# Patient Record
Sex: FEMALE | Race: OTHER | HISPANIC OR LATINO | URBAN - METROPOLITAN AREA
[De-identification: names, ages, dates, MRNs, and addresses within clinical notes are randomized per-mention and may not be internally consistent; named-entity substitution may affect disease eponyms.]

---

## 2022-01-25 ENCOUNTER — OUTPATIENT (OUTPATIENT)
Dept: INPATIENT UNIT | Facility: HOSPITAL | Age: 27
LOS: 1 days | End: 2022-01-25
Payer: MEDICAID

## 2022-01-25 VITALS — DIASTOLIC BLOOD PRESSURE: 63 MMHG | HEART RATE: 53 BPM | SYSTOLIC BLOOD PRESSURE: 103 MMHG

## 2022-01-25 VITALS — SYSTOLIC BLOOD PRESSURE: 100 MMHG | HEART RATE: 56 BPM | DIASTOLIC BLOOD PRESSURE: 61 MMHG

## 2022-01-25 DIAGNOSIS — O47.1 FALSE LABOR AT OR AFTER 37 COMPLETED WEEKS OF GESTATION: ICD-10-CM

## 2022-01-25 LAB
APPEARANCE UR: ABNORMAL
BACTERIA # UR AUTO: ABNORMAL
BASOPHILS # BLD AUTO: 0.02 K/UL — SIGNIFICANT CHANGE UP (ref 0–0.2)
BASOPHILS NFR BLD AUTO: 0.3 % — SIGNIFICANT CHANGE UP (ref 0–2)
BILIRUB UR-MCNC: NEGATIVE — SIGNIFICANT CHANGE UP
BLD GP AB SCN SERPL QL: SIGNIFICANT CHANGE UP
COLOR SPEC: YELLOW — SIGNIFICANT CHANGE UP
DIFF PNL FLD: NEGATIVE — SIGNIFICANT CHANGE UP
EOSINOPHIL # BLD AUTO: 0.19 K/UL — SIGNIFICANT CHANGE UP (ref 0–0.5)
EOSINOPHIL NFR BLD AUTO: 2.5 % — SIGNIFICANT CHANGE UP (ref 0–6)
EPI CELLS # UR: ABNORMAL
GLUCOSE UR QL: NEGATIVE MG/DL — SIGNIFICANT CHANGE UP
HCT VFR BLD CALC: 36.2 % — SIGNIFICANT CHANGE UP (ref 34.5–45)
HGB BLD-MCNC: 12 G/DL — SIGNIFICANT CHANGE UP (ref 11.5–15.5)
HIV 1 & 2 AB SERPL IA.RAPID: SIGNIFICANT CHANGE UP
IMM GRANULOCYTES NFR BLD AUTO: 0.4 % — SIGNIFICANT CHANGE UP (ref 0–1.5)
KETONES UR-MCNC: NEGATIVE — SIGNIFICANT CHANGE UP
LEUKOCYTE ESTERASE UR-ACNC: ABNORMAL
LYMPHOCYTES # BLD AUTO: 2.2 K/UL — SIGNIFICANT CHANGE UP (ref 1–3.3)
LYMPHOCYTES # BLD AUTO: 29.1 % — SIGNIFICANT CHANGE UP (ref 13–44)
MCHC RBC-ENTMCNC: 28.7 PG — SIGNIFICANT CHANGE UP (ref 27–34)
MCHC RBC-ENTMCNC: 33.1 GM/DL — SIGNIFICANT CHANGE UP (ref 32–36)
MCV RBC AUTO: 86.6 FL — SIGNIFICANT CHANGE UP (ref 80–100)
MONOCYTES # BLD AUTO: 0.44 K/UL — SIGNIFICANT CHANGE UP (ref 0–0.9)
MONOCYTES NFR BLD AUTO: 5.8 % — SIGNIFICANT CHANGE UP (ref 2–14)
NEUTROPHILS # BLD AUTO: 4.67 K/UL — SIGNIFICANT CHANGE UP (ref 1.8–7.4)
NEUTROPHILS NFR BLD AUTO: 61.9 % — SIGNIFICANT CHANGE UP (ref 43–77)
NITRITE UR-MCNC: NEGATIVE — SIGNIFICANT CHANGE UP
PH UR: 6 — SIGNIFICANT CHANGE UP (ref 5–8)
PLATELET # BLD AUTO: 211 K/UL — SIGNIFICANT CHANGE UP (ref 150–400)
PROT UR-MCNC: NEGATIVE — SIGNIFICANT CHANGE UP
RBC # BLD: 4.18 M/UL — SIGNIFICANT CHANGE UP (ref 3.8–5.2)
RBC # FLD: 15 % — HIGH (ref 10.3–14.5)
RBC CASTS # UR COMP ASSIST: NEGATIVE /HPF — SIGNIFICANT CHANGE UP (ref 0–4)
SP GR SPEC: 1.01 — SIGNIFICANT CHANGE UP (ref 1.01–1.02)
UROBILINOGEN FLD QL: NEGATIVE MG/DL — SIGNIFICANT CHANGE UP
WBC # BLD: 7.55 K/UL — SIGNIFICANT CHANGE UP (ref 3.8–10.5)
WBC # FLD AUTO: 7.55 K/UL — SIGNIFICANT CHANGE UP (ref 3.8–10.5)
WBC UR QL: ABNORMAL /HPF (ref 0–5)

## 2022-01-25 PROCEDURE — 87340 HEPATITIS B SURFACE AG IA: CPT

## 2022-01-25 PROCEDURE — 81001 URINALYSIS AUTO W/SCOPE: CPT

## 2022-01-25 PROCEDURE — 86850 RBC ANTIBODY SCREEN: CPT

## 2022-01-25 PROCEDURE — 86900 BLOOD TYPING SEROLOGIC ABO: CPT

## 2022-01-25 PROCEDURE — 86762 RUBELLA ANTIBODY: CPT

## 2022-01-25 PROCEDURE — 86703 HIV-1/HIV-2 1 RESULT ANTBDY: CPT

## 2022-01-25 PROCEDURE — 86780 TREPONEMA PALLIDUM: CPT

## 2022-01-25 PROCEDURE — 85025 COMPLETE CBC W/AUTO DIFF WBC: CPT

## 2022-01-25 PROCEDURE — 83020 HEMOGLOBIN ELECTROPHORESIS: CPT | Mod: 26

## 2022-01-25 PROCEDURE — 36415 COLL VENOUS BLD VENIPUNCTURE: CPT

## 2022-01-25 PROCEDURE — 86765 RUBEOLA ANTIBODY: CPT

## 2022-01-25 PROCEDURE — 86901 BLOOD TYPING SEROLOGIC RH(D): CPT

## 2022-01-25 PROCEDURE — G0463: CPT

## 2022-01-25 PROCEDURE — 87389 HIV-1 AG W/HIV-1&-2 AB AG IA: CPT

## 2022-01-25 PROCEDURE — 59025 FETAL NON-STRESS TEST: CPT

## 2022-01-25 PROCEDURE — 83020 HEMOGLOBIN ELECTROPHORESIS: CPT

## 2022-01-25 NOTE — OB PROVIDER TRIAGE NOTE - ATTENDING COMMENTS
No Ob complaints   fetal tracing reactive   no contractions  blood work drawn  pt scheduled for sono 2/2/22  she will follow up in clinic next week

## 2022-01-25 NOTE — OB PROVIDER TRIAGE NOTE - NSHPPHYSICALEXAM_GEN_ALL_CORE
Vital Signs Last 24 Hrs  T(C): 36.8 (25 Jan 2022 12:59), Max: 36.8 (25 Jan 2022 12:59)  T(F): 98.2 (25 Jan 2022 12:59), Max: 98.2 (25 Jan 2022 12:59)  HR: 56 (25 Jan 2022 13:16) (53 - 56)  BP: 100/61 (25 Jan 2022 13:16) (100/61 - 103/63)  BP(mean): --  RR: 16 (25 Jan 2022 12:59) (16 - 16)  SpO2: --    Gen: NAD, AAOx3  CV: rrr, s1/s2 present  Lung: CTABL  Abd: soft, gravid  SVE: deferred  Ext: no pain/swelling    FH: moderate variability, baseline 130s, accels, no decels  [place]: no contractions Hemostasis: Electrocautery

## 2022-01-25 NOTE — OB PROVIDER TRIAGE NOTE - HISTORY OF PRESENT ILLNESS
27yo  @ 35.6w who comes into L&D from the clinic. Patient denies any vaginal bleeding, loss of fluid, contractions. Reports +FM.    Pregnancy complicated by:  limited PNC    PMH: denies  PSH: denies  POB: denies  PGYN: denies  All: NKDA  Med: none

## 2022-01-25 NOTE — OB PROVIDER TRIAGE NOTE - NSOBPROVIDERNOTE_OBGYN_ALL_OB_FT
25 yo  @ 35.6w sent from the office  - PNL drawn  - US appt for 2/2 2pm  - NST reactive  d/w Dr. Interiano - can be discharged

## 2022-01-25 NOTE — OB RN TRIAGE NOTE - FALL HARM RISK - UNIVERSAL INTERVENTIONS
Bed in lowest position, wheels locked, appropriate side rails in place/Call bell, personal items and telephone in reach/Instruct patient to call for assistance before getting out of bed or chair/Non-slip footwear when patient is out of bed/[place] to call system/Physically safe environment - no spills, clutter or unnecessary equipment/Purposeful Proactive Rounding/Room/bathroom lighting operational, light cord in reach

## 2022-01-25 NOTE — OB PROVIDER TRIAGE NOTE - CURRENT PREGNANCY COMPLICATIONS, OB PROFILE
Detail Level: Zone Detail Level: Generalized Detail Level: Detailed Gestational Age less than 36 Weeks

## 2022-01-26 LAB
HBV SURFACE AG SERPL QL IA: SIGNIFICANT CHANGE UP
HEMOGLOBIN INTERPRETATION: SIGNIFICANT CHANGE UP
HGB A MFR BLD: 97.5 % — SIGNIFICANT CHANGE UP (ref 95.8–98)
HGB A2 MFR BLD: 2.5 % — SIGNIFICANT CHANGE UP (ref 2–3.2)
HIV 1+2 AB+HIV1 P24 AG SERPL QL IA: SIGNIFICANT CHANGE UP
MEV IGG SER-ACNC: 42.3 AU/ML — SIGNIFICANT CHANGE UP
MEV IGG+IGM SER-IMP: POSITIVE — SIGNIFICANT CHANGE UP
RUBV IGG SER-ACNC: 13.7 INDEX — SIGNIFICANT CHANGE UP
RUBV IGG SER-IMP: POSITIVE — SIGNIFICANT CHANGE UP
T PALLIDUM AB TITR SER: NEGATIVE — SIGNIFICANT CHANGE UP

## 2022-02-01 PROBLEM — Z78.9 OTHER SPECIFIED HEALTH STATUS: Chronic | Status: ACTIVE | Noted: 2022-01-25

## 2022-02-02 ENCOUNTER — APPOINTMENT (OUTPATIENT)
Dept: ANTEPARTUM | Facility: CLINIC | Age: 27
End: 2022-02-02
Payer: MEDICAID

## 2022-02-02 ENCOUNTER — ASOB RESULT (OUTPATIENT)
Age: 27
End: 2022-02-02

## 2022-02-02 PROBLEM — Z00.00 ENCOUNTER FOR PREVENTIVE HEALTH EXAMINATION: Status: ACTIVE | Noted: 2022-02-02

## 2022-02-02 PROCEDURE — 76816 OB US FOLLOW-UP PER FETUS: CPT

## 2022-02-17 ENCOUNTER — RESULT REVIEW (OUTPATIENT)
Age: 27
End: 2022-02-17

## 2022-02-17 ENCOUNTER — INPATIENT (INPATIENT)
Facility: HOSPITAL | Age: 27
LOS: 2 days | Discharge: HOME | End: 2022-02-20
Attending: OBSTETRICS & GYNECOLOGY | Admitting: OBSTETRICS & GYNECOLOGY
Payer: COMMERCIAL

## 2022-02-17 VITALS
RESPIRATION RATE: 18 BRPM | TEMPERATURE: 98 F | HEART RATE: 66 BPM | SYSTOLIC BLOOD PRESSURE: 119 MMHG | DIASTOLIC BLOOD PRESSURE: 76 MMHG

## 2022-02-17 DIAGNOSIS — O47.1 FALSE LABOR AT OR AFTER 37 COMPLETED WEEKS OF GESTATION: ICD-10-CM

## 2022-02-17 DIAGNOSIS — O26.893 OTHER SPECIFIED PREGNANCY RELATED CONDITIONS, THIRD TRIMESTER: ICD-10-CM

## 2022-02-17 LAB
BASOPHILS # BLD AUTO: 0.03 K/UL — SIGNIFICANT CHANGE UP (ref 0–0.2)
BASOPHILS NFR BLD AUTO: 0.3 % — SIGNIFICANT CHANGE UP (ref 0–2)
BLD GP AB SCN SERPL QL: SIGNIFICANT CHANGE UP
EOSINOPHIL # BLD AUTO: 0.16 K/UL — SIGNIFICANT CHANGE UP (ref 0–0.5)
EOSINOPHIL NFR BLD AUTO: 1.8 % — SIGNIFICANT CHANGE UP (ref 0–6)
HCT VFR BLD CALC: 34.3 % — LOW (ref 34.5–45)
HGB BLD-MCNC: 11.4 G/DL — LOW (ref 11.5–15.5)
HIV 1 & 2 AB SERPL IA.RAPID: SIGNIFICANT CHANGE UP
IMM GRANULOCYTES NFR BLD AUTO: 0.3 % — SIGNIFICANT CHANGE UP (ref 0–1.5)
LYMPHOCYTES # BLD AUTO: 2.44 K/UL — SIGNIFICANT CHANGE UP (ref 1–3.3)
LYMPHOCYTES # BLD AUTO: 27.1 % — SIGNIFICANT CHANGE UP (ref 13–44)
MCHC RBC-ENTMCNC: 29.2 PG — SIGNIFICANT CHANGE UP (ref 27–34)
MCHC RBC-ENTMCNC: 33.2 GM/DL — SIGNIFICANT CHANGE UP (ref 32–36)
MCV RBC AUTO: 87.7 FL — SIGNIFICANT CHANGE UP (ref 80–100)
MONOCYTES # BLD AUTO: 0.58 K/UL — SIGNIFICANT CHANGE UP (ref 0–0.9)
MONOCYTES NFR BLD AUTO: 6.5 % — SIGNIFICANT CHANGE UP (ref 2–14)
NEUTROPHILS # BLD AUTO: 5.75 K/UL — SIGNIFICANT CHANGE UP (ref 1.8–7.4)
NEUTROPHILS NFR BLD AUTO: 64 % — SIGNIFICANT CHANGE UP (ref 43–77)
PLATELET # BLD AUTO: 204 K/UL — SIGNIFICANT CHANGE UP (ref 150–400)
RBC # BLD: 3.91 M/UL — SIGNIFICANT CHANGE UP (ref 3.8–5.2)
RBC # FLD: 17.5 % — HIGH (ref 10.3–14.5)
SARS-COV-2 RNA SPEC QL NAA+PROBE: SIGNIFICANT CHANGE UP
WBC # BLD: 8.99 K/UL — SIGNIFICANT CHANGE UP (ref 3.8–10.5)
WBC # FLD AUTO: 8.99 K/UL — SIGNIFICANT CHANGE UP (ref 3.8–10.5)

## 2022-02-17 PROCEDURE — 88307 TISSUE EXAM BY PATHOLOGIST: CPT | Mod: 26

## 2022-02-17 RX ORDER — AMPICILLIN TRIHYDRATE 250 MG
1 CAPSULE ORAL EVERY 4 HOURS
Refills: 0 | Status: DISCONTINUED | OUTPATIENT
Start: 2022-02-17 | End: 2022-02-18

## 2022-02-17 RX ORDER — OXYTOCIN 10 UNIT/ML
333.33 VIAL (ML) INJECTION
Qty: 20 | Refills: 0 | Status: DISCONTINUED | OUTPATIENT
Start: 2022-02-17 | End: 2022-02-20

## 2022-02-17 RX ORDER — CITRIC ACID/SODIUM CITRATE 300-500 MG
30 SOLUTION, ORAL ORAL ONCE
Refills: 0 | Status: DISCONTINUED | OUTPATIENT
Start: 2022-02-17 | End: 2022-02-18

## 2022-02-17 RX ORDER — SODIUM CHLORIDE 9 MG/ML
1000 INJECTION, SOLUTION INTRAVENOUS
Refills: 0 | Status: DISCONTINUED | OUTPATIENT
Start: 2022-02-17 | End: 2022-02-18

## 2022-02-17 RX ORDER — OXYTOCIN 10 UNIT/ML
2 VIAL (ML) INJECTION
Qty: 30 | Refills: 0 | Status: DISCONTINUED | OUTPATIENT
Start: 2022-02-17 | End: 2022-02-20

## 2022-02-17 RX ORDER — AMPICILLIN TRIHYDRATE 250 MG
2 CAPSULE ORAL ONCE
Refills: 0 | Status: COMPLETED | OUTPATIENT
Start: 2022-02-17 | End: 2022-02-17

## 2022-02-17 RX ADMIN — Medication 2 MILLIUNIT(S)/MIN: at 20:30

## 2022-02-17 RX ADMIN — Medication 108 GRAM(S): at 22:31

## 2022-02-17 NOTE — OB PROVIDER H&P - NSHPPHYSICALEXAM_GEN_ALL_CORE
HR: 66 (17 Feb 2022 17:13) (66 - 66)  BP: 119/76 (17 Feb 2022 17:13) (119/76 - 119/76)     Gen: AAOx3  Abd: soft, gravid  Ext: no tenderness to calf palpation    SVE: 5/50/-2    FHT: baseline 130, moderate variability, + acels, -decels  [place]: CTX q4-5min HR: 66 (17 Feb 2022 17:13) (66 - 66)  BP: 119/76 (17 Feb 2022 17:13) (119/76 - 119/76)     Gen: AAOx3  Abd: soft, gravid  Ext: no tenderness to calf palpation    SVE: 5/50/-2  Bedside sono: VTX, anterior placenta    FHT: baseline 130, moderate variability, + acels, -decels  [place]: CTX q4-5min

## 2022-02-17 NOTE — OB PROVIDER H&P - ATTENDING COMMENTS
G1 at 22lxf7d admitted for labor. GBSpos. Maternal/fetal status reassuring    -admit  -expectant mgmt  -routine admission orders  -reassess in 2hrs or prn  -amp for GBS proph  -consents explained and signed  all questions and concerns answered  ppalos

## 2022-02-17 NOTE — OB PROVIDER H&P - HISTORY OF PRESENT ILLNESS
27yo  at 39w1d who presents from clinic with cervical exam of /-3. Patient endorses CTX q5min. Endorses good FM. Denies LOF and VB. Denies HA, changes in vision, CP, SOB, nausea and vomiting.     JAH: 22  LMP: 21    Pregnancy course significant for:  1. Positive quant, needs CXR PP    ObHx: denies  GynHx: denies hx of ovarian cysts, fibroids, STIs and abnormal paps  PMH: denies  Meds: PNV  All: NKDA  SurgHx: denies  SocialHx: denies EtOH, tobacco and illicit drug use in pregnancy

## 2022-02-17 NOTE — OB PROVIDER H&P - ASSESSMENT
A/P: 27yo  at 39w1d who is admitted to L&D in labor  -Admit to L&D  -Consent  -Admission labs  -IV fluids  -Fetus: Cat I tracing. Continuous toco and fetal monitoring.   -GBS: positive, amp ordered    Discussed with Dr. Dumont

## 2022-02-17 NOTE — OB RN PATIENT PROFILE - FALL HARM RISK - UNIVERSAL INTERVENTIONS
Bed in lowest position, wheels locked, appropriate side rails in place/Call bell, personal items and telephone in reach/Instruct patient to call for assistance before getting out of bed or chair/Non-slip footwear when patient is out of bed/[place] to call system/Physically safe environment - no spills, clutter or unnecessary equipment/Purposeful Proactive Rounding/Room/bathroom lighting operational, light cord in reach

## 2022-02-18 ENCOUNTER — TRANSCRIPTION ENCOUNTER (OUTPATIENT)
Age: 27
End: 2022-02-18

## 2022-02-18 LAB
COVID-19 SPIKE DOMAIN AB INTERP: POSITIVE
COVID-19 SPIKE DOMAIN ANTIBODY RESULT: 34.2 U/ML — HIGH
HIV 1+2 AB+HIV1 P24 AG SERPL QL IA: SIGNIFICANT CHANGE UP
MEV IGG SER-ACNC: 44.6 AU/ML — SIGNIFICANT CHANGE UP
MEV IGG+IGM SER-IMP: POSITIVE — SIGNIFICANT CHANGE UP
SARS-COV-2 IGG+IGM SERPL QL IA: 34.2 U/ML — HIGH
SARS-COV-2 IGG+IGM SERPL QL IA: POSITIVE
T PALLIDUM AB TITR SER: NEGATIVE — SIGNIFICANT CHANGE UP

## 2022-02-18 RX ORDER — ACETAMINOPHEN 500 MG
975 TABLET ORAL
Refills: 0 | Status: DISCONTINUED | OUTPATIENT
Start: 2022-02-18 | End: 2022-02-20

## 2022-02-18 RX ORDER — LANOLIN
1 OINTMENT (GRAM) TOPICAL EVERY 6 HOURS
Refills: 0 | Status: DISCONTINUED | OUTPATIENT
Start: 2022-02-18 | End: 2022-02-20

## 2022-02-18 RX ORDER — KETOROLAC TROMETHAMINE 30 MG/ML
30 SYRINGE (ML) INJECTION ONCE
Refills: 0 | Status: DISCONTINUED | OUTPATIENT
Start: 2022-02-18 | End: 2022-02-18

## 2022-02-18 RX ORDER — DIPHENHYDRAMINE HCL 50 MG
25 CAPSULE ORAL EVERY 6 HOURS
Refills: 0 | Status: DISCONTINUED | OUTPATIENT
Start: 2022-02-18 | End: 2022-02-20

## 2022-02-18 RX ORDER — DIBUCAINE 1 %
1 OINTMENT (GRAM) RECTAL EVERY 6 HOURS
Refills: 0 | Status: DISCONTINUED | OUTPATIENT
Start: 2022-02-18 | End: 2022-02-20

## 2022-02-18 RX ORDER — BENZOCAINE 10 %
1 GEL (GRAM) MUCOUS MEMBRANE EVERY 6 HOURS
Refills: 0 | Status: DISCONTINUED | OUTPATIENT
Start: 2022-02-18 | End: 2022-02-20

## 2022-02-18 RX ORDER — SODIUM CHLORIDE 9 MG/ML
3 INJECTION INTRAMUSCULAR; INTRAVENOUS; SUBCUTANEOUS EVERY 8 HOURS
Refills: 0 | Status: DISCONTINUED | OUTPATIENT
Start: 2022-02-18 | End: 2022-02-20

## 2022-02-18 RX ORDER — MAGNESIUM HYDROXIDE 400 MG/1
30 TABLET, CHEWABLE ORAL
Refills: 0 | Status: DISCONTINUED | OUTPATIENT
Start: 2022-02-18 | End: 2022-02-20

## 2022-02-18 RX ORDER — TETANUS TOXOID, REDUCED DIPHTHERIA TOXOID AND ACELLULAR PERTUSSIS VACCINE, ADSORBED 5; 2.5; 8; 8; 2.5 [IU]/.5ML; [IU]/.5ML; UG/.5ML; UG/.5ML; UG/.5ML
0.5 SUSPENSION INTRAMUSCULAR ONCE
Refills: 0 | Status: DISCONTINUED | OUTPATIENT
Start: 2022-02-18 | End: 2022-02-20

## 2022-02-18 RX ORDER — AER TRAVELER 0.5 G/1
1 SOLUTION RECTAL; TOPICAL EVERY 4 HOURS
Refills: 0 | Status: DISCONTINUED | OUTPATIENT
Start: 2022-02-18 | End: 2022-02-20

## 2022-02-18 RX ORDER — IBUPROFEN 200 MG
600 TABLET ORAL EVERY 6 HOURS
Refills: 0 | Status: COMPLETED | OUTPATIENT
Start: 2022-02-18 | End: 2023-01-17

## 2022-02-18 RX ORDER — SIMETHICONE 80 MG/1
80 TABLET, CHEWABLE ORAL EVERY 4 HOURS
Refills: 0 | Status: DISCONTINUED | OUTPATIENT
Start: 2022-02-18 | End: 2022-02-20

## 2022-02-18 RX ORDER — HYDROCORTISONE 1 %
1 OINTMENT (GRAM) TOPICAL EVERY 6 HOURS
Refills: 0 | Status: DISCONTINUED | OUTPATIENT
Start: 2022-02-18 | End: 2022-02-20

## 2022-02-18 RX ORDER — OXYTOCIN 10 UNIT/ML
333.33 VIAL (ML) INJECTION
Qty: 20 | Refills: 0 | Status: DISCONTINUED | OUTPATIENT
Start: 2022-02-18 | End: 2022-02-20

## 2022-02-18 RX ORDER — SODIUM CHLORIDE 9 MG/ML
1000 INJECTION, SOLUTION INTRAVENOUS ONCE
Refills: 0 | Status: COMPLETED | OUTPATIENT
Start: 2022-02-18 | End: 2022-02-18

## 2022-02-18 RX ORDER — PRAMOXINE HYDROCHLORIDE 150 MG/15G
1 AEROSOL, FOAM RECTAL EVERY 4 HOURS
Refills: 0 | Status: DISCONTINUED | OUTPATIENT
Start: 2022-02-18 | End: 2022-02-20

## 2022-02-18 RX ADMIN — Medication 30 MILLIGRAM(S): at 09:17

## 2022-02-18 RX ADMIN — Medication 108 GRAM(S): at 06:00

## 2022-02-18 RX ADMIN — Medication 30 MILLIGRAM(S): at 09:39

## 2022-02-18 RX ADMIN — SODIUM CHLORIDE 3 MILLILITER(S): 9 INJECTION INTRAMUSCULAR; INTRAVENOUS; SUBCUTANEOUS at 22:37

## 2022-02-18 RX ADMIN — Medication 108 GRAM(S): at 02:13

## 2022-02-18 RX ADMIN — Medication 1 TABLET(S): at 16:12

## 2022-02-18 RX ADMIN — Medication 975 MILLIGRAM(S): at 16:11

## 2022-02-18 RX ADMIN — Medication 1000 MILLIUNIT(S)/MIN: at 07:33

## 2022-02-18 RX ADMIN — SODIUM CHLORIDE 1000 MILLILITER(S): 9 INJECTION, SOLUTION INTRAVENOUS at 03:59

## 2022-02-18 NOTE — DISCHARGE NOTE OB - NS MD DC FALL RISK RISK
For information on Fall & Injury Prevention, visit: [email or web address] OR  [email or web address] OR  https://www.cdc.gov/steadi/patient.html

## 2022-02-18 NOTE — DISCHARGE NOTE OB - MATERIALS PROVIDED
New Beginnings/Vaccinations/Breastfeeding Log/Breastfeeding Mother’s Support Group Information/Guide to Postpartum Care/[place] Hearing Screen Program/Back To Sleep Handout/Shaken Baby Prevention Handout/Breastfeeding Guide and Packet/Birth Certificate Instructions

## 2022-02-18 NOTE — OB PROVIDER LABOR PROGRESS NOTE - NS_OBIHIFHRDETAILS_OBGYN_ALL_OB_FT
FHT: baseline 135, mod variability, +accels, early decels
FHT: baseline 150, mod variability, -accels, early decels
FHT: baseline 135, mod variability, -accels, late decels
FHT: baseline 145, mod variability, -accels, variable decel

## 2022-02-18 NOTE — OB PROVIDER DELIVERY SUMMARY - NSPROVIDERDELIVERYNOTE_OBGYN_ALL_OB_FT
Admitted in labor with pitocin augmentation and epidural for labor progressed to deliver  live baby boy at 7:28 AM compound presentation right hand. Placenta spontaneous complete. No epis, no laceration. Blood loss 280 cc.

## 2022-02-18 NOTE — OB RN DELIVERY SUMMARY - NSSELHIDDEN_OBGYN_ALL_OB_FT
[NS_DeliveryAttending1_OBGYN_ALL_OB_FT:[id number]==],[NS_DeliveryRN_OBGYN_ALL_OB_FT:[id number]=],[NS_CirculateRN2_OBGYN_ALL_OB_FT:[id number]=]

## 2022-02-18 NOTE — DISCHARGE NOTE OB - MEDICATION SUMMARY - MEDICATIONS TO TAKE
I will START or STAY ON the medications listed below when I get home from the hospital:    ibuprofen 600 mg oral tablet  -- 1 tab(s) by mouth every 6 hours, As Needed -for mild pain   -- Do not take this drug if you are pregnant.  It is very important that you take or use this exactly as directed.  Do not skip doses or discontinue unless directed by your doctor.  May cause drowsiness or dizziness.  Obtain medical advice before taking any non-prescription drugs as some may affect the action of this medication.  Take with food or milk.    -- Indication: For pain    Tylenol 325 mg oral tablet  -- 2 tab(s) by mouth every 6 hours, As Needed -for moderate pain   -- This product contains acetaminophen.  Do not use  with any other product containing acetaminophen to prevent possible liver damage.    -- Indication: For pain

## 2022-02-18 NOTE — DISCHARGE NOTE OB - HOSPITAL COURSE
s/p uncomplicated  on . Patient transferred to post partum unit, uncomplicated hospital course. At the time of discharge patient was tolerating regular diet PO, ambulating, voiding, and demonstrating bowel function. Pain well controlled with pain medications PRN.

## 2022-02-18 NOTE — DISCHARGE NOTE OB - PATIENT PORTAL LINK FT
You can access the FollowMyHealth Patient Portal offered by [facility] by registering at the following website: [email or web address]. By joining [facility]’s FollowMyHealth portal, you will also be able to view your health information using other applications (apps) compatible with our system.

## 2022-02-18 NOTE — OB PROVIDER LABOR PROGRESS NOTE - NS_SUBJECTIVE/OBJECTIVE_OBGYN_ALL_OB_FT
pt feeling her ctx painfully    Vital Signs Last 24 Hrs  T(C): 36.8 (17 Feb 2022 19:21), Max: 37.1 (17 Feb 2022 19:00)  T(F): 98.24 (17 Feb 2022 19:21), Max: 98.78 (17 Feb 2022 19:00)  HR: 64 (17 Feb 2022 19:22) (60 - 66)  BP: 95/58 (17 Feb 2022 19:22) (91/58 - 119/76)  RR: 16 (17 Feb 2022 19:00) (16 - 18)
pt comfortable w epi in place    Vital Signs Last 24 Hrs  T(C): 36.8 (18 Feb 2022 03:24), Max: 37.1 (17 Feb 2022 19:00)  T(F): 98.24 (18 Feb 2022 03:24), Max: 98.78 (17 Feb 2022 19:00)  HR: 69 (18 Feb 2022 03:53) (52 - 86)  BP: 114/61 (18 Feb 2022 03:53) (91/58 - 131/80)  RR: 16 (17 Feb 2022 19:00) (16 - 18)  SpO2: 100% (18 Feb 2022 03:52) (98% - 100%)
pt comfortable w epi    Vital Signs Last 24 Hrs  T(C): 36.8 (18 Feb 2022 01:30), Max: 37.1 (17 Feb 2022 19:00)  T(F): 98.24 (18 Feb 2022 01:30), Max: 98.78 (17 Feb 2022 19:00)  HR: 80 (18 Feb 2022 02:07) (52 - 86)  BP: 105/52 (18 Feb 2022 01:59) (91/58 - 131/80)  RR: 16 (17 Feb 2022 19:00) (16 - 18)  SpO2: 100% (18 Feb 2022 02:02) (98% - 100%)
pt comfortable w epi in place  starting to feel some discomfort w vaginal pressure

## 2022-02-18 NOTE — OB PROVIDER LABOR PROGRESS NOTE - ASSESSMENT
VSS  cat 1 FHT  on pit. increase as tolerated per fht and ctx pattern  s/p AROM mec  continuous fetal and toco monitoring 
AROM mec at time of exam  variable decel s/p AROM, tracing  has been otherwise cat 1  continuous monitoring  epidural prn  start pit in 30m if tracing permits    discussed with Dr. Griffith
VSS  intermittently cat 2 FHT. reposition and resuscitate as necessary   on pit. modify as indicated per fht  continuous fetal and toco monitoring 
VSS  cat 1 FHT  on pit  continuous fetal and toco monitoring

## 2022-02-18 NOTE — OB NEONATOLOGY/PEDIATRICIAN DELIVERY SUMMARY - NSPEDSNEONOTESA_OBGYN_ALL_OB_FT
Called to 39.2 week vaginal delivery for meconium. Baby emerged depressed with poor tone and minimal respiratory effort. PPV 20/5 given x30 seconds with improvement in heart rate and spontaneous respiratory effort/color. CPAP given x2 minutes. Baby appeared comfortable with no retractions. Sats . Dispo to [facility].

## 2022-02-18 NOTE — OB RN DELIVERY SUMMARY - NS_SEPSISRSKCALC_OBGYN_ALL_OB_FT
EOS calculated successfully. EOS Risk Factor: 0.5/1000 live births ([facility] national incidence); GA=39w2d; Temp=98.78; ROM=9.433; GBS='Positive'; Antibiotics='Broad spectrum antibiotics > 4 hrs prior to birth'

## 2022-02-18 NOTE — DISCHARGE NOTE OB - CARE PROVIDER_API CALL
Carson Interiano)  Obstetrics and Gynecology  [address] [place] [address]  Phone: [phone]  Fax: [phone]  Follow Up Time: 1 month

## 2022-02-18 NOTE — DISCHARGE NOTE OB - CARE PLAN
Principal Discharge DX:	 (normal spontaneous vaginal delivery)  Assessment and plan of treatment:	1) Please take ibuprofen and tylenol as needed for pain.  2) Nothing in the vagina for 6 weeks (including no sex, no tampons, and no douching).  3) No tub baths or pools for 2 weeks. Showers are okay.  4) Please call your doctor for a follow up appointment  5) Please call the office sooner if you have heavy vaginal bleeding, severe abdominal pain, or fever over 100.4F.   1

## 2022-02-19 VITALS
OXYGEN SATURATION: 98 % | RESPIRATION RATE: 16 BRPM | TEMPERATURE: 98 F | HEART RATE: 70 BPM | DIASTOLIC BLOOD PRESSURE: 55 MMHG | SYSTOLIC BLOOD PRESSURE: 93 MMHG

## 2022-02-19 LAB
HCT VFR BLD CALC: 27.9 % — LOW (ref 34.5–45)
HGB BLD-MCNC: 9.3 G/DL — LOW (ref 11.5–15.5)

## 2022-02-19 RX ORDER — ACETAMINOPHEN 500 MG
2 TABLET ORAL
Qty: 56 | Refills: 0
Start: 2022-02-19 | End: 2022-02-25

## 2022-02-19 RX ORDER — IBUPROFEN 200 MG
1 TABLET ORAL
Qty: 28 | Refills: 0
Start: 2022-02-19 | End: 2022-02-25

## 2022-02-19 RX ORDER — IBUPROFEN 200 MG
600 TABLET ORAL EVERY 6 HOURS
Refills: 0 | Status: DISCONTINUED | OUTPATIENT
Start: 2022-02-19 | End: 2022-02-20

## 2022-02-19 RX ADMIN — Medication 600 MILLIGRAM(S): at 17:55

## 2022-02-19 RX ADMIN — Medication 600 MILLIGRAM(S): at 12:15

## 2022-02-19 RX ADMIN — SODIUM CHLORIDE 3 MILLILITER(S): 9 INJECTION INTRAMUSCULAR; INTRAVENOUS; SUBCUTANEOUS at 06:12

## 2022-02-19 RX ADMIN — Medication 975 MILLIGRAM(S): at 14:42

## 2022-02-19 RX ADMIN — Medication 975 MILLIGRAM(S): at 09:29

## 2022-02-19 RX ADMIN — Medication 1 TABLET(S): at 12:14

## 2022-02-19 RX ADMIN — Medication 975 MILLIGRAM(S): at 03:37

## 2022-02-19 RX ADMIN — Medication 975 MILLIGRAM(S): at 00:29

## 2022-02-20 PROCEDURE — 85014 HEMATOCRIT: CPT

## 2022-02-20 PROCEDURE — U0003: CPT

## 2022-02-20 PROCEDURE — 85025 COMPLETE CBC W/AUTO DIFF WBC: CPT

## 2022-02-20 PROCEDURE — 86850 RBC ANTIBODY SCREEN: CPT

## 2022-02-20 PROCEDURE — 88307 TISSUE EXAM BY PATHOLOGIST: CPT

## 2022-02-20 PROCEDURE — 59025 FETAL NON-STRESS TEST: CPT

## 2022-02-20 PROCEDURE — 86900 BLOOD TYPING SEROLOGIC ABO: CPT

## 2022-02-20 PROCEDURE — 36415 COLL VENOUS BLD VENIPUNCTURE: CPT

## 2022-02-20 PROCEDURE — 86703 HIV-1/HIV-2 1 RESULT ANTBDY: CPT

## 2022-02-20 PROCEDURE — 86780 TREPONEMA PALLIDUM: CPT

## 2022-02-20 PROCEDURE — G0463: CPT

## 2022-02-20 PROCEDURE — 59050 FETAL MONITOR W/REPORT: CPT

## 2022-02-20 PROCEDURE — 86765 RUBEOLA ANTIBODY: CPT

## 2022-02-20 PROCEDURE — 85018 HEMOGLOBIN: CPT

## 2022-02-20 PROCEDURE — U0005: CPT

## 2022-02-20 PROCEDURE — 87389 HIV-1 AG W/HIV-1&-2 AB AG IA: CPT

## 2022-02-20 PROCEDURE — 86769 SARS-COV-2 COVID-19 ANTIBODY: CPT

## 2022-02-20 PROCEDURE — 86901 BLOOD TYPING SEROLOGIC RH(D): CPT

## 2022-02-20 RX ADMIN — Medication 975 MILLIGRAM(S): at 07:55

## 2022-02-20 RX ADMIN — Medication 1 TABLET(S): at 11:55

## 2022-02-20 NOTE — PROGRESS NOTE ADULT - SUBJECTIVE AND OBJECTIVE BOX
S: Patient doing well. Minimal lochia. No complaints.     O: Vital Signs Last 24 Hrs  T(C): 36.4 (2022 15:58), Max: 36.4 (2022 15:58)  T(F): 97.6 (2022 15:58), Max: 97.6 (2022 15:58)  HR: 70 (2022 15:58) (70 - 70)  BP: 93/55 (2022 15:58) (93/55 - 93/55)  BP(mean): --  RR: 16 (2022 15:58) (16 - 16)  SpO2: 98% (2022 15:58) (98% - 98%)    Gen: NAD  Breast :Breast feeding  Abd: soft, NT, ND, fundus firm below umbilicus  lochia mild, voiding well.  Ext: no tenderness    Labs:                        9.3    x     )-----------( x        ( 2022 07:31 )             27.9            PP # 2 s/p     Doing well.  Discharge home today.  Nothing in vagina and no heavy lifting for 6 weeks.   Follow up 4 weeks for post partum visit.  Call office for any fevers, chills, heavy vaginal bleeding, symptoms of depression, or any other concerning symptoms.  Continue motrin 600 mg every 6 hours.              
CELIA COLON is a 26y  now PPD#2 s/p normal spontaneous vaginal delivery @39w2d GA, uncomplicated.    S:    No acute events overnight.   The patient has no complaints.  Pain controlled with current treatment regimen.   She is ambulating without difficulty and tolerating PO.   + flatus/-BM/+ voiding   She endorses appropriate lochia, which is decreasing.   She denies fevers, chills, nausea and vomiting.   She denies lightheadedness, dizziness, palpitations, chest pain and SOB.     O:    Vital Signs Last 24 Hrs  T(C): 36.4 (2022 15:58), Max: 36.4 (2022 15:58)  T(F): 97.6 (2022 15:58), Max: 97.6 (2022 15:58)  HR: 70 (2022 15:58) (70 - 70)  BP: 93/55 (2022 15:58) (93/55 - 93/55)  RR: 16 (2022 15:58) (16 - 16)  SpO2: 98% (2022 15:58) (98% - 98%)    Gen: NAD, AOx3  Resp: breathing comfortably on RA  Abdomen:  Soft, non-tender, non-distended  Uterus:  Fundus firm below umbilicus  VE:  Lochia as expected                            11.4   8.99  )-----------( 204      ( 2022 18:29 )             34.3             
CELIA COLON is a 26y  now PPD#1 s/p normal spontaneous vaginal delivery @39w2d GA, uncomplicated.    S:    No acute events overnight.   The patient has no complaints.  Pain controlled with current treatment regimen.   She is ambulating without difficulty and tolerating PO.   + flatus/-BM/+ voiding   She endorses appropriate lochia, which is decreasing.   She denies fevers, chills, nausea and vomiting.   She denies lightheadedness, dizziness, palpitations, chest pain and SOB.     O:    T(C): 36.9 (22 @ 15:27), Max: 37.2 (22 @ 10:20)  HR: 62 (22 @ 15:27) (56 - 112)  BP: 111/73 (22 @ 15:27) (108/56 - 142/69)  RR: 18 (22 @ 15:27) (16 - 18)  SpO2: 98% (22 @ 15:27) (92% - 100%)    Gen: NAD, AOx3  Resp: breathing comfortably on RA  Abdomen:  Soft, non-tender, non-distended  Uterus:  Fundus firm below umbilicus  VE:  Lochia as expected  LE:  Non-tender and non-edematous                          11.4   8.99  )-----------( 204      ( 2022 18:29 )             34.3

## 2022-02-20 NOTE — PROGRESS NOTE ADULT - ASSESSMENT
A/P:  CELIA COLON is a 26y  now PPD#1 s/p normal spontaneous vaginal delivery @39w2d GA, uncomplicated.  -Vital signs stable  -Hgb: 11.4 -> AM labs pending   -Voiding, tolerating PO  -Advance care as tolerated   -Continue routine postpartum care and education  -Healthy male infant  -Dispo: Patient to be discharged when meeting all postpartum milestones and pending attending approval.   
A/P:  CELIA COLON is a 26y  now PPD#2 s/p normal spontaneous vaginal delivery @39w2d GA, uncomplicated.  -Vital signs stable  -Hgb: 11.4 ->9.3  -Voiding, tolerating PO  -Advance care as tolerated   -Continue routine postpartum care and education  -Healthy male infant  -Dispo: Patient meeting all postpartum milestones. Anticipate discharge today pending attending approval.

## 2022-03-07 LAB — SURGICAL PATHOLOGY STUDY: SIGNIFICANT CHANGE UP

## 2022-12-29 NOTE — OB RN PATIENT PROFILE - PRO FEEDING PLAN INFANT OB
Patient requesting refills of her prednisone & Doxycyline.  She doesn't need the medication now but wants it on hand before the end of the year as it's cheaper for her now.    initiation of breastfeeding/breast milk feeding

## 2025-01-20 NOTE — OB PROVIDER TRIAGE NOTE - NS_FETALHEARTRATE_OBGYN_ALL_OB_FT
Hortencia Sethi called to request her appointments this morning be rescheduled as she is not feeling well. She feels congested and tired but is not febrile or short of breath. Her partner is also feeling sick. She did have one small emesis this morning but has not been persistently nauseous. Plan for Hortencia Sethi to follow up with her PCP, [place] Blak[place] at [facility] [place] for theses symptoms. Hortencia Stehi also knows if her symptoms worsen (becomes short of breath or chest pain) that she should present to the ED.    Plan to reschedule labs, phlebotomy, and vaccines to another time this week, when she is feeling better.   
125